# Patient Record
Sex: FEMALE | Race: WHITE | NOT HISPANIC OR LATINO | URBAN - NONMETROPOLITAN AREA
[De-identification: names, ages, dates, MRNs, and addresses within clinical notes are randomized per-mention and may not be internally consistent; named-entity substitution may affect disease eponyms.]

---

## 2020-08-12 ENCOUNTER — APPOINTMENT (OUTPATIENT)
Dept: URBAN - NONMETROPOLITAN AREA CLINIC 45 | Age: 25
Setting detail: DERMATOLOGY
End: 2020-08-13

## 2020-08-12 DIAGNOSIS — L44.8 OTHER SPECIFIED PAPULOSQUAMOUS DISORDERS: ICD-10-CM

## 2020-08-12 PROCEDURE — 99202 OFFICE O/P NEW SF 15 MIN: CPT

## 2020-08-12 PROCEDURE — OTHER COUNSELING: TOPICAL STEROIDS: OTHER

## 2020-08-12 PROCEDURE — OTHER COUNSELING: OTHER

## 2020-08-12 PROCEDURE — OTHER TREATMENT REGIMEN: OTHER

## 2020-08-12 PROCEDURE — OTHER PRESCRIPTION: OTHER

## 2020-08-12 PROCEDURE — OTHER MIPS QUALITY: OTHER

## 2020-08-12 RX ORDER — CLOBETASOL PROPIONATE 0.5 MG/ML
APPLY SOLUTION TOPICAL EVERY NIGHT
Qty: 1 | Refills: 2 | Status: ERX | COMMUNITY
Start: 2020-08-12

## 2020-08-12 RX ORDER — KETOCONAZOLE 20 MG/ML
WASH SHAMPOO, SUSPENSION TOPICAL ONCE A DAY
Qty: 1 | Refills: 3 | Status: ERX | COMMUNITY
Start: 2020-08-12

## 2020-08-12 ASSESSMENT — LOCATION SIMPLE DESCRIPTION DERM: LOCATION SIMPLE: FRONTAL SCALP

## 2020-08-12 ASSESSMENT — LOCATION ZONE DERM: LOCATION ZONE: SCALP

## 2020-08-12 ASSESSMENT — LOCATION DETAILED DESCRIPTION DERM: LOCATION DETAILED: MEDIAL FRONTAL SCALP

## 2020-08-12 NOTE — PROCEDURE: TREATMENT REGIMEN
Plan: Ketoconazole 2% shampoo once a day (let sit 5-10 minutes then rinse)\\nOTC TSAL shampoo (apply after Ketoconazole)\\nClobetasol scalp solution every night
Detail Level: Zone

## 2020-11-23 ENCOUNTER — APPOINTMENT (OUTPATIENT)
Dept: URBAN - NONMETROPOLITAN AREA CLINIC 45 | Age: 25
Setting detail: DERMATOLOGY
End: 2020-11-23

## 2020-11-23 DIAGNOSIS — L44.8 OTHER SPECIFIED PAPULOSQUAMOUS DISORDERS: ICD-10-CM

## 2020-11-23 PROCEDURE — OTHER TREATMENT REGIMEN: OTHER

## 2020-11-23 PROCEDURE — OTHER MIPS QUALITY: OTHER

## 2020-11-23 PROCEDURE — OTHER COUNSELING: TOPICAL STEROIDS: OTHER

## 2020-11-23 PROCEDURE — 99213 OFFICE O/P EST LOW 20 MIN: CPT

## 2020-11-23 PROCEDURE — OTHER COUNSELING: OTHER

## 2020-11-23 PROCEDURE — OTHER PRESCRIPTION: OTHER

## 2020-11-23 RX ORDER — FLUOCINOLONE ACETONIDE 0.11 MG/ML
APPLY OIL TOPICAL ONCE A DAY
Qty: 1 | Refills: 3 | Status: ERX | COMMUNITY
Start: 2020-11-23

## 2020-11-23 ASSESSMENT — LOCATION DETAILED DESCRIPTION DERM: LOCATION DETAILED: MEDIAL FRONTAL SCALP

## 2020-11-23 ASSESSMENT — LOCATION SIMPLE DESCRIPTION DERM: LOCATION SIMPLE: FRONTAL SCALP

## 2020-11-23 ASSESSMENT — LOCATION ZONE DERM: LOCATION ZONE: SCALP

## 2020-11-23 NOTE — PROCEDURE: TREATMENT REGIMEN
Plan: Continue Ketoconazole 2% shampoo once a day (let sit 5-10 minutes then rinse)\\nOTC TSAL shampoo (apply after Ketoconazole)\\nDiscontinue Clobetasol scalp solution every other night\\nAdd Derma-Smoothe/FS Scalp Oil 0.01 % to scalp every night to every other night\\nCulture sent today ([facility])
Detail Level: Zone

## 2020-11-23 NOTE — PROCEDURE: MIPS QUALITY
Detail Level: Detailed
Quality 110: Preventive Care And Screening: Influenza Immunization: Influenza Immunization not Administered because Patient Refused.
Additional Notes: Advised patient to visit local pharmacy for flu vaccine